# Patient Record
Sex: FEMALE | Race: BLACK OR AFRICAN AMERICAN | NOT HISPANIC OR LATINO | Employment: UNEMPLOYED | URBAN - METROPOLITAN AREA
[De-identification: names, ages, dates, MRNs, and addresses within clinical notes are randomized per-mention and may not be internally consistent; named-entity substitution may affect disease eponyms.]

---

## 2018-01-29 ENCOUNTER — HOSPITAL ENCOUNTER (EMERGENCY)
Facility: CLINIC | Age: 58
Discharge: HOME | End: 2018-01-29
Attending: EMERGENCY MEDICINE | Admitting: EMERGENCY MEDICINE
Payer: COMMERCIAL

## 2018-01-29 VITALS
TEMPERATURE: 98.6 F | RESPIRATION RATE: 20 BRPM | OXYGEN SATURATION: 98 % | SYSTOLIC BLOOD PRESSURE: 123 MMHG | HEART RATE: 88 BPM | DIASTOLIC BLOOD PRESSURE: 72 MMHG

## 2018-01-29 DIAGNOSIS — B02.9 HERPES ZOSTER WITHOUT COMPLICATION: ICD-10-CM

## 2018-01-29 PROCEDURE — 99283 EMERGENCY DEPT VISIT LOW MDM: CPT

## 2018-01-29 RX ORDER — TRAMADOL HYDROCHLORIDE 50 MG/1
50 TABLET ORAL EVERY 6 HOURS PRN
Qty: 15 TABLET | Refills: 0 | Status: SHIPPED | OUTPATIENT
Start: 2018-01-29

## 2018-01-29 RX ORDER — VALACYCLOVIR HYDROCHLORIDE 1 G/1
1000 TABLET, FILM COATED ORAL 3 TIMES DAILY
Qty: 21 TABLET | Refills: 0 | Status: SHIPPED | OUTPATIENT
Start: 2018-01-29

## 2018-01-29 ASSESSMENT — ENCOUNTER SYMPTOMS
NAUSEA: 0
BACK PAIN: 1
VOMITING: 0
FEVER: 0
ABDOMINAL PAIN: 1

## 2018-01-29 NOTE — DISCHARGE INSTRUCTIONS
Please follow up closely with your regular physician. Please return to the ED if your symptoms worsen or if you develop new or concerning symptoms.         Shingles  Shingles is a viral infection caused by the same virus as chicken pox. Anyone who has had chicken pox may get shingles later in life. The virus stays in the body, but remains dormant (asleep). Shingles often occurs in older persons or persons with lowered immunity. But it can affect anyone at any age.  Shingles starts as a tingling patch of skin on one side of the body. Small, painful blisters may then appear. The rash does not spread to the rest of the body.  Exposure to shingles cannot cause shingles. However, it can cause chicken pox in anyone who has not had chicken pox or has not been vaccinated. The contagious period ends when all blisters have crusted over (generally about 2 weeks after the illness begins).  After the blisters heal, the affected skin may be sensitive or painful for months (neuralgia). This often gradually goes away.  A shingles vaccine is available. This can help prevent shingles or make it less painful. It is generally recommended for adults over the age of 60 who have had chicken pox in the past, but who have never had shingles. Adults over 60 who have had neither chicken pox nor shingles can prevent both diseases with the chicken pox vaccine. Ask your healthcare provider about these vaccines.  Home care    Medicines may be prescribed to help relieve pain. Take these medicines as directed. Ask your healthcare provider or pharmacist before using over-the-counter medicines for helping treat pain and itching.    In certain cases, antiviral medicines may be prescribed to reduce pain, shorten the illness, and prevent neuralgia. Take these medicines as directed.    Compresses made from a solution of cool water mixed with cornstarch or baking soda may help relieve pain and itching.     Gently wash skin daily with soap and water to  help prevent infection.  Be certain to rinse off all of the soap, which can be irritating.    Trim fingernails and try not to scratch. Scratching the sores may leave scars.    Stay home from work or school until all blisters have formed a crust and you are no longer contagious.  Follow-up care  Follow up with your healthcare provider or as directed by our staff.  When to seek medical advice    Fever of 100.4 F (38 C) or higher, or as directed by your healthcare provider    Affected skin is on the face or neck and any of the following occur:    Headache    Eye pain    Changes in vision    Sores near the eye    Weakness of facial muscles    Pain, redness, or swelling of a joint    Signs of skin infection: colored drainage from the sores, warmth, increasing redness, or increasing pain  Date Last Reviewed: 9/25/2015 2000-2017 [employer]. [address] [place] [address]. All rights reserved. This information is not intended as a substitute for professional medical care. Always follow your healthcare professional's instructions.

## 2018-01-29 NOTE — ED PROVIDER NOTES
History     Chief Complaint:  Abdominal Pain and Groin Pain      HPI   Candace Baez is a 58 year old female who presents with rash and RLQ abdominal/groin pain. The patient reports that beginning about a week ago she began to have a rash in the RLQ that extended to her back. The worsening RLQ pain and burning prompted today's visit to the emergency department.     Here now in the emergency department, she reports continuing RLQ and back pain and rash, but denies fever, nausea, or vomiting. She states that she has not had this before. She reports taking Tylenol for the pain.     Allergies:  NKDA     Medications:    The patient is currently on no regular medications.      Past Medical History:    The patient denies any significant past medical history.    Past Surgical History:    The patient does not have any pertinent past surgical history  Family History:    No past pertinent family history.    Social History:  The patient presents with her .   Marital Status:   [2]    Review of Systems   Constitutional: Negative for fever.   Gastrointestinal: Positive for abdominal pain. Negative for nausea and vomiting.   Musculoskeletal: Positive for back pain.   Skin: Positive for rash.   All other systems reviewed and are negative.    Physical Exam   First Vitals:  BP: 123/72  Pulse: 88  Temp: 98.6  F (37  C)  Resp: 20  SpO2: 98 %    Physical Exam  Constitutional: The patient is oriented to person, place, and time. Alert and cooperative.  HENT:   Right Ear: External ear normal.   Left Ear: External ear normal.   Nose: Nose normal.   Mouth/Throat: Uvula is midline, oropharynx is clear and moist and mucous membranes are normal. No posterior oropharyngeal edema or erythema.   Eyes: Conjunctivae, EOM and lids are normal. Pupils are equal, round, and reactive to light.   Neck: Trachea normal. Normal range of motion. Neck supple.   Cardiovascular: Normal rate, regular rhythm, normal heart sounds, and intact distal  pulses.    Pulmonary/Chest: Effort normal and breath sounds equal bilaterally. No crackles or wheezing.   Abdominal: Soft. No tenderness. No rebound and no guarding.   Musculoskeletal: Normal range of motion.  No extremity tenderness or edema.  Neurological: Alert and Oriented. Strength 5/5 in upper and lower extremities bilaterally. Sensation intact to light touch throughout.  Skin: Erythema with scattered vesicular lesions to the R flank extended into the abdomen along a dermatome. No purulent drainage.          Emergency Department Course     Emergency Department Course:  Nursing notes and vitals reviewed. 1748 I performed an exam of the patient as documented above.     1801 I rechecked the patient and discussed the results of her workup thus far.     Findings and plan explained to the Patient and spouse. Patient discharged home with instructions regarding supportive care, medications, and reasons to return. The importance of close follow-up was reviewed. The patient was prescribed Valtrex and Ultram.     Impression & Plan      Medical Decision Making:  Candace Baez is a 58 year old female who presents for evaluation of painful rash on her abdomen and back.  This is consistent clinically with herpes zoster.  Will start valacyclovir for this and pain medicine as noted above.  No signs at this point of disseminated zoster nor V1/eye involvement.  Patient is not immunocompromised and I see no signs of systemic illness that might have lead to this.  Therefore, I will not get lab work to look for things like HIV or leukemia. She has no findings to suggest a superimposed bacterial infectious process. No evidence to suggest an acute intra-abdominal process. I did discuss with the patient that I recommend close follow up with her regular physician and she notes understanding and agreement with this plan. Return instructions were given. She was stable/improved at the time of discharge.     Diagnosis:    ICD-10-CM   1.  Herpes zoster without complication B02.9       Disposition:  discharged to home    Discharge Medications:   Details   valACYclovir (VALTREX) 1000 mg tablet Take 1 tablet (1,000 mg) by mouth 3 times daily, Disp-21 tablet, R-0, Local Print      traMADol (ULTRAM) 50 MG tablet Take 1 tablet (50 mg) by mouth every 6 hours as needed for pain, Disp-15 tablet, R-0, Local Print           I, Camila Valencia, am serving as a scribe on 1/29/2018 at 5:35 PM to personally document services performed by Kelsy Carnes MD based on my observations and the provider's statements to me.       Camila Valencia  1/29/2018   [facility] EMERGENCY DEPARTMENT       Kelsy Carnes MD  01/30/18 1737

## 2018-01-29 NOTE — ED AVS SNAPSHOT
[facility] Emergency Department    [address]    [facility] [address]    Phone:  [phone]    Fax:  [phone]                                       Candace Baez   MRN: [id number]    Department:  [facility] Emergency Department   Date of Visit:  1/29/2018           After Visit Summary Signature Page     I have received my discharge instructions, and my questions have been answered. I have discussed any challenges I see with this plan with the nurse or doctor.    ..........................................................................................................................................  Patient/Patient Representative Signature      ..........................................................................................................................................  Patient Representative Print Name and Relationship to Patient    ..................................................               ................................................  Date                                            Time    ..........................................................................................................................................  Reviewed by Signature/Title    ...................................................              ..............................................  Date                                                            Time

## 2018-01-29 NOTE — ED AVS SNAPSHOT
[facility] Emergency Department    [address]    [facility] [address]    Phone:  [phone]    Fax:  [phone]                                       Candace Baez   MRN: [id number]    Department:  [facility] Emergency Department   Date of Visit:  1/29/2018           Patient Information     Date Of Birth          1960        Your diagnoses for this visit were:     Herpes zoster without complication        You were seen by Kelsy Carnes MD.      Follow-up Information     Follow up with [facility] In 3 days.    Specialties:  Sports Medicine, Pain Management, Obstetrics & Gynecology, Pediatrics, Internal Medicine, Nephrology    Contact information:    [address]  [facility] [address] [phone]        Discharge Instructions       Please follow up closely with your regular physician. Please return to the ED if your symptoms worsen or if you develop new or concerning symptoms.         Shingles  Shingles is a viral infection caused by the same virus as chicken pox. Anyone who has had chicken pox may get shingles later in life. The virus stays in the body, but remains dormant (asleep). Shingles often occurs in older persons or persons with lowered immunity. But it can affect anyone at any age.  Shingles starts as a tingling patch of skin on one side of the body. Small, painful blisters may then appear. The rash does not spread to the rest of the body.  Exposure to shingles cannot cause shingles. However, it can cause chicken pox in anyone who has not had chicken pox or has not been vaccinated. The contagious period ends when all blisters have crusted over (generally about 2 weeks after the illness begins).  After the blisters heal, the affected skin may be sensitive or painful for months (neuralgia). This often gradually goes away.  A shingles vaccine is available. This can help prevent shingles or make it less  painful. It is generally recommended for adults over the age of 60 who have had chicken pox in the past, but who have never had shingles. Adults over 60 who have had neither chicken pox nor shingles can prevent both diseases with the chicken pox vaccine. Ask your healthcare provider about these vaccines.  Home care    Medicines may be prescribed to help relieve pain. Take these medicines as directed. Ask your healthcare provider or pharmacist before using over-the-counter medicines for helping treat pain and itching.    In certain cases, antiviral medicines may be prescribed to reduce pain, shorten the illness, and prevent neuralgia. Take these medicines as directed.    Compresses made from a solution of cool water mixed with cornstarch or baking soda may help relieve pain and itching.     Gently wash skin daily with soap and water to help prevent infection.  Be certain to rinse off all of the soap, which can be irritating.    Trim fingernails and try not to scratch. Scratching the sores may leave scars.    Stay home from work or school until all blisters have formed a crust and you are no longer contagious.  Follow-up care  Follow up with your healthcare provider or as directed by our staff.  When to seek medical advice    Fever of 100.4 F (38 C) or higher, or as directed by your healthcare provider    Affected skin is on the face or neck and any of the following occur:    Headache    Eye pain    Changes in vision    Sores near the eye    Weakness of facial muscles    Pain, redness, or swelling of a joint    Signs of skin infection: colored drainage from the sores, warmth, increasing redness, or increasing pain  Date Last Reviewed: 9/25/2015 2000-2017 [employer]. [address] [place] [address]. All rights reserved. This information is not intended as a substitute for professional medical care. Always follow your healthcare professional's instructions.          24 Hour Appointment  Hotline       To make an appointment at any [facility], call [phone] ([phone]). If you don't have a family doctor or clinic, we will help you find one. [facility] are conveniently located to serve the needs of you and your family.             Review of your medicines      START taking        Dose / Directions Last dose taken    traMADol 50 MG tablet   Commonly known as:  ULTRAM   Dose:  50 mg   Quantity:  15 tablet        Take 1 tablet (50 mg) by mouth every 6 hours as needed for pain   Refills:  0        valACYclovir 1000 mg tablet   Commonly known as:  VALTREX   Dose:  1000 mg   Quantity:  21 tablet        Take 1 tablet (1,000 mg) by mouth 3 times daily   Refills:  0                Prescriptions were sent or printed at these locations (2 Prescriptions)                   Other Prescriptions                Printed at Department/Unit printer (2 of 2)         valACYclovir (VALTREX) 1000 mg tablet               traMADol (ULTRAM) 50 MG tablet                Orders Needing Specimen Collection     None      Pending Results     No orders found from 1/27/2018 to 1/30/2018.            Pending Culture Results     No orders found from 1/27/2018 to 1/30/2018.            Pending Results Instructions     If you had any lab results that were not finalized at the time of your Discharge, you can call the ED Lab Result RN at [phone]. You will be contacted by this team for any positive Lab results or changes in treatment. The nurses are available 7 days a week from 10A to 6:30P.  You can leave a message 24 hours per day and they will return your call.        Test Results From Your Hospital Stay               Clinical Quality Measure: Blood Pressure Screening     Your blood pressure was checked while you were in the emergency department today. The last reading we obtained was  BP: 123/72 . Please read the guidelines below about what these numbers mean and what you should do about them.  If your systolic  "blood pressure (the top number) is less than 120 and your diastolic blood pressure (the bottom number) is less than 80, then your blood pressure is normal. There is nothing more that you need to do about it.  If your systolic blood pressure (the top number) is 120-139 or your diastolic blood pressure (the bottom number) is 80-89, your blood pressure may be higher than it should be. You should have your blood pressure rechecked within a year by a primary care provider.  If your systolic blood pressure (the top number) is 140 or greater or your diastolic blood pressure (the bottom number) is 90 or greater, you may have high blood pressure. High blood pressure is treatable, but if left untreated over time it can put you at risk for heart attack, stroke, or kidney failure. You should have your blood pressure rechecked by a primary care provider within the next 4 weeks.  If your provider in the emergency department today gave you specific instructions to follow-up with your doctor or provider even sooner than that, you should follow that instruction and not wait for up to 4 weeks for your follow-up visit.        Thank you for choosing [facility]       Thank you for choosing [facility] for your care. Our goal is always to provide you with excellent care. Hearing back from our patients is one way we can continue to improve our services. Please take a few minutes to complete the written survey that you may receive in the mail after you visit with us. Thank you!        [employer] Information     [employer] lets you send messages to your doctor, view your test results, renew your prescriptions, schedule appointments and more. To sign up, go to [email or web address] . Click on \"Log in\" on the left side of the screen, which will take you to the Welcome page. Then click on \"Sign up Now\" on the right side of the page.     You will be asked to enter the access code listed below, as well as some personal information. Please follow the " directions to create your username and password.     Your access code is: [email or web address]  Expires: 2018  6:33 PM     Your access code will  in 90 days. If you need help or a new code, please call your [facility] clinic or [phone].        Care EveryWhere ID     This is your Care EveryWhere ID. This could be used by other organizations to access your [facility] medical records  [id number]        Equal Access to Services     TAMEKA CARRASCO : Hadii farshad fletcher hadasho Soomaali, waaxda luqadaha, qaybta kaalmada adeegyada, maite cummings . So [facility] [phone].    ATENCIÓN: Si habla español, tiene a antonio disposición servicios gratuitos de asistencia lingüística. Llame al [phone].    We comply with applicable federal civil rights laws and [place] laws. We do not discriminate on the basis of race, color, national origin, age, disability, sex, sexual orientation, or gender identity.            After Visit Summary       This is your record. Keep this with you and show to your community pharmacist(s) and doctor(s) at your next visit.

## 2019-01-15 ENCOUNTER — HOSPITAL ENCOUNTER (EMERGENCY)
Facility: CLINIC | Age: 59
Discharge: HOME | End: 2019-01-15
Attending: EMERGENCY MEDICINE | Admitting: EMERGENCY MEDICINE
Payer: COMMERCIAL

## 2019-01-15 ENCOUNTER — APPOINTMENT (OUTPATIENT)
Dept: MRI IMAGING | Facility: CLINIC | Age: 59
End: 2019-01-15
Payer: COMMERCIAL

## 2019-01-15 VITALS
RESPIRATION RATE: 16 BRPM | WEIGHT: 170 LBS | HEART RATE: 65 BPM | DIASTOLIC BLOOD PRESSURE: 81 MMHG | TEMPERATURE: 98.3 F | OXYGEN SATURATION: 98 % | SYSTOLIC BLOOD PRESSURE: 163 MMHG

## 2019-01-15 DIAGNOSIS — R42 VERTIGO: ICD-10-CM

## 2019-01-15 LAB
ANION GAP SERPL CALCULATED.3IONS-SCNC: 6 MMOL/L (ref 3–14)
BASOPHILS # BLD AUTO: 0 10E9/L (ref 0–0.2)
BASOPHILS NFR BLD AUTO: 0.2 %
BUN SERPL-MCNC: 11 MG/DL (ref 7–30)
CALCIUM SERPL-MCNC: 8.9 MG/DL (ref 8.5–10.1)
CHLORIDE SERPL-SCNC: 102 MMOL/L (ref 94–109)
CO2 SERPL-SCNC: 28 MMOL/L (ref 20–32)
CREAT SERPL-MCNC: 0.79 MG/DL (ref 0.52–1.04)
DIFFERENTIAL METHOD BLD: NORMAL
EOSINOPHIL # BLD AUTO: 0.1 10E9/L (ref 0–0.7)
EOSINOPHIL NFR BLD AUTO: 0.9 %
ERYTHROCYTE [DISTWIDTH] IN BLOOD BY AUTOMATED COUNT: 12.8 % (ref 10–15)
GFR SERPL CREATININE-BSD FRML MDRD: 82 ML/MIN/{1.73_M2}
GLUCOSE SERPL-MCNC: 105 MG/DL (ref 70–99)
HCT VFR BLD AUTO: 41 % (ref 35–47)
HGB BLD-MCNC: 13.3 G/DL (ref 11.7–15.7)
IMM GRANULOCYTES # BLD: 0 10E9/L (ref 0–0.4)
IMM GRANULOCYTES NFR BLD: 0.2 %
LYMPHOCYTES # BLD AUTO: 1.5 10E9/L (ref 0.8–5.3)
LYMPHOCYTES NFR BLD AUTO: 17.9 %
MCH RBC QN AUTO: 31.4 PG (ref 26.5–33)
MCHC RBC AUTO-ENTMCNC: 32.4 G/DL (ref 31.5–36.5)
MCV RBC AUTO: 97 FL (ref 78–100)
MONOCYTES # BLD AUTO: 0.5 10E9/L (ref 0–1.3)
MONOCYTES NFR BLD AUTO: 5.4 %
NEUTROPHILS # BLD AUTO: 6.4 10E9/L (ref 1.6–8.3)
NEUTROPHILS NFR BLD AUTO: 75.4 %
NRBC # BLD AUTO: 0 10*3/UL
NRBC BLD AUTO-RTO: 0 /100
PLATELET # BLD AUTO: 190 10E9/L (ref 150–450)
POTASSIUM SERPL-SCNC: 3.7 MMOL/L (ref 3.4–5.3)
RBC # BLD AUTO: 4.23 10E12/L (ref 3.8–5.2)
SODIUM SERPL-SCNC: 136 MMOL/L (ref 133–144)
TROPONIN I SERPL-MCNC: <0.015 UG/L (ref 0–0.04)
WBC # BLD AUTO: 8.5 10E9/L (ref 4–11)

## 2019-01-15 PROCEDURE — 25500064 ZZH RX 255 OP 636: Performed by: EMERGENCY MEDICINE

## 2019-01-15 PROCEDURE — 99285 EMERGENCY DEPT VISIT HI MDM: CPT | Mod: 25

## 2019-01-15 PROCEDURE — 70549 MR ANGIOGRAPH NECK W/O&W/DYE: CPT

## 2019-01-15 PROCEDURE — 80048 BASIC METABOLIC PNL TOTAL CA: CPT | Performed by: EMERGENCY MEDICINE

## 2019-01-15 PROCEDURE — 70553 MRI BRAIN STEM W/O & W/DYE: CPT

## 2019-01-15 PROCEDURE — A9585 GADOBUTROL INJECTION: HCPCS | Performed by: EMERGENCY MEDICINE

## 2019-01-15 PROCEDURE — 25000128 H RX IP 250 OP 636: Performed by: EMERGENCY MEDICINE

## 2019-01-15 PROCEDURE — 70544 MR ANGIOGRAPHY HEAD W/O DYE: CPT

## 2019-01-15 PROCEDURE — 93005 ELECTROCARDIOGRAM TRACING: CPT

## 2019-01-15 PROCEDURE — 96374 THER/PROPH/DIAG INJ IV PUSH: CPT | Mod: 59

## 2019-01-15 PROCEDURE — 84484 ASSAY OF TROPONIN QUANT: CPT | Performed by: EMERGENCY MEDICINE

## 2019-01-15 PROCEDURE — 85025 COMPLETE CBC W/AUTO DIFF WBC: CPT | Performed by: EMERGENCY MEDICINE

## 2019-01-15 RX ORDER — AMLODIPINE BESYLATE 5 MG/1
5 TABLET ORAL DAILY
COMMUNITY

## 2019-01-15 RX ORDER — LISINOPRIL 10 MG/1
10 TABLET ORAL DAILY
COMMUNITY

## 2019-01-15 RX ORDER — LORAZEPAM 2 MG/ML
1 INJECTION INTRAMUSCULAR ONCE
Status: COMPLETED | OUTPATIENT
Start: 2019-01-15 | End: 2019-01-15

## 2019-01-15 RX ORDER — GADOBUTROL 604.72 MG/ML
10 INJECTION INTRAVENOUS ONCE
Status: COMPLETED | OUTPATIENT
Start: 2019-01-15 | End: 2019-01-15

## 2019-01-15 RX ORDER — CHOLECALCIFEROL (VITAMIN D3) 50 MCG
1 TABLET ORAL DAILY
COMMUNITY

## 2019-01-15 RX ADMIN — GADOBUTROL 10 ML: 604.72 INJECTION INTRAVENOUS at 22:07

## 2019-01-15 RX ADMIN — LORAZEPAM 1 MG: 2 INJECTION INTRAMUSCULAR; INTRAVENOUS at 20:47

## 2019-01-15 ASSESSMENT — ENCOUNTER SYMPTOMS
DIZZINESS: 1
HEADACHES: 0
NUMBNESS: 0
WEAKNESS: 0

## 2019-01-15 NOTE — ED AVS SNAPSHOT
[facility] Emergency Department  [address]  [facility] [address]  Phone:  [phone]  Fax:  [phone]                                    Ashley Ramos   MRN: [id number]    Department:  [facility] Emergency Department   Date of Visit:  1/15/2019           After Visit Summary Signature Page    I have received my discharge instructions, and my questions have been answered. I have discussed any challenges I see with this plan with the nurse or doctor.    ..........................................................................................................................................  Patient/Patient Representative Signature      ..........................................................................................................................................  Patient Representative Print Name and Relationship to Patient    ..................................................               ................................................  Date                                   Time    ..........................................................................................................................................  Reviewed by Signature/Title    ...................................................              ..............................................  Date                                               Time          22EPIC Rev 08/18

## 2019-01-15 NOTE — ED TRIAGE NOTES
"Pt presents with \"Extreme dizziness\". Started at around 5:10 PM. Describes as the \"room spinning super fast and it feels better when I tilt me head to the right\" Feels like she has to keep her eyes close due to the dizziness. Denies nausea or headache. Denies recent head injury, not on blood thinners.   "

## 2019-01-16 LAB — INTERPRETATION ECG - MUSE: NORMAL

## 2019-01-16 NOTE — ED PROVIDER NOTES
"  History     Chief Complaint:  Dizziness    HPI   Ashley Ramos is a 59 year old female, with a history of hypertension, who presents with dizziness. The patient's daughter states that the patient had a sudden onset of dizziness while washing dishes approximately 1.5 hours ago. She notes that she did feel off balance and felt like she was going to fall, but landed on the couch, prompting their ED visit. Here, the patient states that she had resolution of symptoms after approximately 10 minutes from onset. She denies any associated vision changes, headache, one sided numbness or weakness, head trauma, or other injury. She additionally denies any past medical history of MI or diabetes. The patient notes that she was evaluated by ENT clinic on 12/27 for tinnitus and ongoing \"itching and popping sensation\" in her ears.       Allergies:  NKDA     Medications:    Amlodipine  Lisinopril    Past Medical History:    Hypertension  Aortic valve stenosis  Hyperlipidemia  H. Pylori infection    Past Surgical History:    The patient does not have any pertinent past surgical history.     Family History:    No past pertinent family history.     Social History:  Presents with her daughter.  Negative for alcohol use.  Negative for tobacco use.     Review of Systems   Eyes: Negative for visual disturbance.   Neurological: Positive for dizziness (resolved). Negative for weakness, numbness and headaches.   All other systems reviewed and are negative.      Physical Exam   First Vitals:  BP: 168/87  Pulse: 73  Heart Rate: 73  Temp: 98.3  F (36.8  C)  Resp: 16  Weight: 77.1 kg (170 lb)  SpO2: 100 %    Physical Exam  Constitutional:  Appears well-developed and well-nourished. Alert. Conversant with her daughter in [place], who interprets.  They declined a formal .  Patient seems to understand a lot of English.  She seems alert, intelligent.  Non toxic.  HENT:   Head: Atraumatic. No depressed skull fracture, [place] " Eyes, Castaneda's sign, or hemotympanum. Face normal. TMs normal  Nose: Nose normal.  Mouth/Throat: Oral mucosa is clear and moist. no trismus. Pharynx normal. Tonsils symmetric. No tonsillar enlargement, erythema, or exudate.  Eyes: no nystagmus. Conjunctivae normal. EOM normal. Pupils equal, round, and reactive to light. No scleral icterus.   Neck: Normal range of motion. Neck supple. No tracheal deviation present.   Cardiovascular: Normal rate, regular rhythm. No gallop. No friction rub. No murmur heard. Symmetric radial artery pulses   Pulmonary/Chest: Effort normal. No stridor. No respiratory distress. No wheezes. No rales. No rhonchi . No tenderness.   Abdominal: Soft. Bowel sounds normal. No distension. No mass. No tenderness. No rebound. No guarding.   Musculoskeletal:   RUE: Normal range of motion. No tenderness. No deformity  LUE: Normal range of motion. No tenderness. No deformity  RLE: Normal range of motion. No edema. No tenderness. No deformity  LLE: Normal range of motion. No edema. No tenderness. No deformity  Lymph: No cervical adenopathy.   Neurological: Alert and oriented to person, place, and time. Mental status normal. Attention normal.  Alert and oriented x3.  GCS 15. Memory normal. Speech fluent. Cognition normal.    Cranial Nerves intact II-XII except I did not formally test gag or visual acuity.  EOMI. Palate elevates symmetrically and tongue protrudes in the midline.    Strength:   5/5 bilaterally in the trapezius, 5/5 bilaterally in the deltoid, 5/5 bilaterally in the biceps, 5/5 bilaterally in the triceps, 5/5 bilaterally in thegrip, 5/5 bilaterally thumb opposition, 5/5 bilaterally finger abduction  5/5 bilaterally in the psoas, 5/5 bilaterally in the quadriceps, 5/5 bilaterally in the hamstring, 5/5 bilaterally in the gastrocnemius, 5/5 bilaterally in the tibialis anterior    Sensation intact to light touch in both upper extremities (C4-T1)  Sensation intact to light touch in Both lower  extremities (L4-S1).     Finger to nose and coordination normal. Gait normal.  Romberg negative.  Skin: Skin is warm and dry. No rash noted. No pallor. Normal capillary refill.  Psychiatric:  Normal mood. Normal affect.     Emergency Department Course   ECG:  Indication: dizziness  Time: 1949  Vent. Rate 68 bpm. DC interval 170. QRS duration 94. QT/QTc 374/397. P-R-T axis 54 4 62. Normal sinus rhythm. Normal ECG. Read time: 1956.      Imaging:  Radiographic findings were communicated with the patient who voiced understanding of the findings.  MR Neck w/o and w/ contrast angiogram:   Normal MR angiogram of the neck. As per radiology     MR Brain w/o and w/ contrast:   Diffuse cerebral volume loss and cerebral white matter  changes consistent with chronic small vessel ischemic disease. No  evidence for acute intracranial pathology.  As per radiology    MR Head w/o contrast angiogram:   Normal MR angiogram of the head.  As per radiology     Laboratory:  CBC: WBC: 8.5, HGB: 13.3, PLT: 190  BMP: Glucose 105 (H) o/w WNL (Creatinine: 0.79)     Troponin: <0.015    Interventions:  2047 Ativan, 1 mg, IV      Emergency Department Course:  Nursing notes and vitals reviewed. EKG was obtained, findings above.      1920  I performed an exam of the patient as documented above.     IV inserted. Medicine administered as documented above. Blood drawn. This was sent to the lab for further testing, results above.    The patient was sent for a brain, neck, and head MRI while in the emergency department, findings above.     2257 I rechecked the patient and discussed the results of her workup thus far.     Findings and plan explained to the Patient. Patient discharged home with instructions regarding supportive care, medications, and reasons to return. The importance of close follow-up was reviewed. The patient was prescribed aspirin.    I personally reviewed the laboratory results with the Patient and answered all related questions prior  to discharge.       Impression & Plan      Medical Decision Making:  Ashley Ramos is a 59 year old female who came to the ER today with her daughter for evaluation of an acute 10-minute episode of intense dizziness and nausea and difficulty walking.  I suspect that they are describing an episode of vertigo.  It apparently began when she was twisting and bending to put a pot into the sink.  Here in the ER she is now symptom-free and neurologically intact.  We were not able to provoke her vertigo by position change, sitting up, laying down, or ambulation.  Initial differential for her dizziness, presumed vertigo, was broad.  I considered possible BPPV but without being able to provoke it here in the ER as not a definitive diagnosis.  Other causes of acute vestibular syndrome were considered, but seem unlikely with such a brief episode.  We considered possible TIA.    Workup for possible TIA was undertaken.  EKG shows sinus rhythm and no A. fib.  Labs are normal.  MRI shows no acute stroke but does show chronic small vessel ischemic disease.  MRA shows no evidence for atherosclerotic disease in the vertebrals or carotids that would put her at risk for stroke.  ABCD 2 score is 2, which puts her in a low risk category.  I think the patient safe for outpatient follow-up and further workup.  She may need an echocardiogram to look for PFO and further cardiac monitoring to look for transient arrhythmias.  We will start the patient on aspirin.  Precautions to return to the ER immediately with recurrent symptoms.    Differential would also include cardiac arrhythmia.  Here in the ER she has sinus rhythm and no normality.  She had no associated chest pain, and EKG is nonischemic/troponin is normal.  I do not think this represents ACS.  Electrolytes are normal.  Patient has no associated abdominal pain, recent symptoms of GI bleeding, and is not anemic.    Diagnosis:    ICD-10-CM    1. Vertigo R42     an episode of  peripheral vertigo vs TIA       Disposition:  discharged to home    Discharge Medications:   Details   aspirin (ASA) 81 MG tablet Take 1 tablet (81 mg) by mouth daily for 14 days  Qty: 14 tablet, Refills: 0       I, Keren Villatoro, yashira serving as a scribe on 1/15/2019 at 7:12 PM to personally document services performed by Rajan Rg MD based on my observations and the provider's statements to me.      Keren Villatoro  1/15/2019   [facility] EMERGENCY DEPARTMENT       Rajan Rg MD  01/15/19 [id number]

## 2024-06-03 ENCOUNTER — APPOINTMENT (OUTPATIENT)
Dept: GENERAL RADIOLOGY | Facility: CLINIC | Age: 64
End: 2024-06-03
Attending: EMERGENCY MEDICINE
Payer: COMMERCIAL

## 2024-06-03 ENCOUNTER — HOSPITAL ENCOUNTER (EMERGENCY)
Facility: CLINIC | Age: 64
Discharge: HOME | End: 2024-06-03
Attending: PHYSICIAN ASSISTANT | Admitting: PHYSICIAN ASSISTANT
Payer: COMMERCIAL

## 2024-06-03 VITALS
TEMPERATURE: 97.5 F | SYSTOLIC BLOOD PRESSURE: 133 MMHG | HEART RATE: 81 BPM | RESPIRATION RATE: 18 BRPM | OXYGEN SATURATION: 98 % | DIASTOLIC BLOOD PRESSURE: 56 MMHG

## 2024-06-03 DIAGNOSIS — S80.00XA CONTUSION OF KNEE, UNSPECIFIED LATERALITY, INITIAL ENCOUNTER: ICD-10-CM

## 2024-06-03 PROCEDURE — 250N000013 HC RX MED GY IP 250 OP 250 PS 637: Performed by: PHYSICIAN ASSISTANT

## 2024-06-03 PROCEDURE — 73562 X-RAY EXAM OF KNEE 3: CPT | Mod: 50

## 2024-06-03 PROCEDURE — 99283 EMERGENCY DEPT VISIT LOW MDM: CPT

## 2024-06-03 RX ORDER — IBUPROFEN 600 MG/1
600 TABLET, FILM COATED ORAL EVERY 6 HOURS PRN
Qty: 40 TABLET | Refills: 0 | Status: SHIPPED | OUTPATIENT
Start: 2024-06-03

## 2024-06-03 RX ORDER — ACETAMINOPHEN 500 MG
1000 TABLET ORAL ONCE
Status: COMPLETED | OUTPATIENT
Start: 2024-06-03 | End: 2024-06-03

## 2024-06-03 RX ORDER — ACETAMINOPHEN 500 MG
500-1000 TABLET ORAL EVERY 6 HOURS PRN
Qty: 40 TABLET | Refills: 0 | Status: SHIPPED | OUTPATIENT
Start: 2024-06-03

## 2024-06-03 RX ADMIN — ACETAMINOPHEN 1000 MG: 500 TABLET, FILM COATED ORAL at 18:21

## 2024-06-03 ASSESSMENT — COLUMBIA-SUICIDE SEVERITY RATING SCALE - C-SSRS
1. IN THE PAST MONTH, HAVE YOU WISHED YOU WERE DEAD OR WISHED YOU COULD GO TO SLEEP AND NOT WAKE UP?: NO
2. HAVE YOU ACTUALLY HAD ANY THOUGHTS OF KILLING YOURSELF IN THE PAST MONTH?: NO
6. HAVE YOU EVER DONE ANYTHING, STARTED TO DO ANYTHING, OR PREPARED TO DO ANYTHING TO END YOUR LIFE?: NO

## 2024-06-03 ASSESSMENT — ACTIVITIES OF DAILY LIVING (ADL)
ADLS_ACUITY_SCORE: 35
ADLS_ACUITY_SCORE: 35

## 2024-06-03 NOTE — ED TRIAGE NOTES
Walking and slipped on wet floor.  C/o bilat knee pain. No other injuries.  No thinners.      Triage Assessment (Adult)       Row Name 06/03/24 5006          Triage Assessment    Airway WDL WDL        Respiratory WDL    Respiratory WDL WDL        Skin Circulation/Temperature WDL    Skin Circulation/Temperature WDL WDL        Cardiac WDL    Cardiac WDL WDL        Peripheral/Neurovascular WDL    Peripheral Neurovascular WDL WDL        Cognitive/Neuro/Behavioral WDL    Cognitive/Neuro/Behavioral WDL WDL

## 2024-06-03 NOTE — ED PROVIDER NOTES
Emergency Department Note      History of Present Illness     Chief Complaint  Fall    HPI  Candace Baez is a 64 year old female with a history of diabetes mellitus and hyperlipidemia, who presents to the ED for bilateral knee pain after slipping on a wet floor and falling. The patient iced her knees, but took no pain medication. She is not on any blood thinners.  Denies, hitting her head. No back, hip. Neck or head pain. Pain with walking, but still able to bear weight.    Independent Historian  Male  present at bedside and corroborate the above    Review of External Notes    Past Medical History   Medical History and Problem List  Colonic polyp  Asthma  Type 2 diabetes   Anemia  Hyperlipidemia   PTSD  TB lung   OA  Tension headache   Recurrent miscarriages  Depression  Insomnia   Vertigo   Chronic headache   Aortic valve stenosis   Fibromyalgia     Medications  Tramadol  Valacyclovir  Omeprazole   Albuterol  Metformin  Pravastatin     Surgical History   Lap cholecystectomy   section   Female circumcision  Colonoscopy   Physical Exam   Patient Vitals for the past 24 hrs:   BP Temp Temp src Pulse Resp SpO2   24 1657 133/56 97.5  F (36.4  C) Temporal 81 18 98 %             Physical Exam  Vitals and nursing note reviewed.   Constitutional:       Appearance: She is not diaphoretic.   HENT:      Head: Atraumatic. No raccoon eyes, Sosa's sign, right periorbital erythema or left periorbital erythema.      Nose: Nose normal.   Eyes:      Conjunctiva/sclera: Conjunctivae normal.   Pulmonary:      Effort: Pulmonary effort is normal.   Musculoskeletal:      Cervical back: No pain with movement. Normal range of motion.      Thoracic back: No swelling, deformity or tenderness. Normal range of motion.      Lumbar back: No swelling, deformity or tenderness. Normal range of motion.      Right hip: No deformity or tenderness. Normal range of motion. Normal strength.      Left hip: No deformity or  tenderness. Normal range of motion. Normal strength.      Right upper leg: No swelling or tenderness.      Left upper leg: No swelling or tenderness.      Right knee: No swelling, deformity, effusion, erythema or ecchymosis. Normal range of motion. Tenderness present over the medial joint line and lateral joint line. No patellar tendon tenderness. No LCL laxity, MCL laxity, ACL laxity or PCL laxity. Normal alignment.      Left knee: No swelling, deformity, effusion, erythema or ecchymosis. Normal range of motion. Tenderness present over the medial joint line and lateral joint line. No patellar tendon tenderness. No LCL laxity, MCL laxity, ACL laxity or PCL laxity.Normal alignment.      Right lower leg: No swelling, deformity or tenderness.      Left lower leg: No swelling, deformity or tenderness.      Right ankle: No swelling or ecchymosis. Normal range of motion. Normal pulse.      Left ankle: No swelling or ecchymosis. Normal range of motion. Normal pulse.   Skin:     Capillary Refill: Capillary refill takes less than 2 seconds.      Coloration: Skin is not jaundiced or pale.      Findings: No bruising.   Neurological:      Mental Status: She is alert and oriented to person, place, and time. Mental status is at baseline.      GCS: GCS eye subscore is 4. GCS verbal subscore is 5. GCS motor subscore is 6.      Cranial Nerves: No cranial nerve deficit, dysarthria or facial asymmetry.      Sensory: Sensation is intact.      Motor: Motor function is intact.   Psychiatric:         Mood and Affect: Mood normal.         Behavior: Behavior normal.         Thought Content: Thought content normal.             Diagnostics   Lab Results   Labs Ordered and Resulted from Time of ED Arrival to Time of ED Departure - No data to display    Imaging  XR Knee Bilateral 3 Views   Final Result   IMPRESSION:    RIGHT KNEE: No acute fracture or malalignment. Mild medial and patellofemoral compartment degenerative changes. No knee joint  effusion.      LEFT KNEE: No acute fracture or malalignment. Mild medial and patellofemoral compartment degenerative changes. No knee joint effusion.        Independent Interpretation  I independently interpreted the Knee XR,  No fractures or dislocations.   ED Course    Medications Administered  Medications   acetaminophen (TYLENOL) tablet 1,000 mg (1,000 mg Oral $Given 6/3/24 4231)       Procedures  Procedures     Discussion of Management  None    Social Determinants of Health adding to complexity of care  None    ED Course     Medical Decision Making / Diagnosis   CMS Diagnoses: None    MIPS     None    MDM  Candace Baez is a 64 year old female presents after mechanical fall.  Patient's only complaint is bilateral knee pain.  Patient is able to stand bear weight she given Tylenol with improvement of her pain.  X-ray imaging obtained showing no evidence of fracture.  The rest of her exam is not concerning for additional injury of the back chest extremities neck or head.  She is neurovascularly intact.  Recommend follow-up with her primary doctor if her pain is persistent if she develops worsening concerns or symptoms return back to the emergency department.    Disposition  The patient was discharged.     ICD-10 Codes:    ICD-10-CM    1. Contusion of knee, unspecified laterality, initial encounter  S80.00XA            Discharge Medications  Discharge Medication List as of 6/3/2024  6:22 PM        START taking these medications    Details   acetaminophen (TYLENOL) 500 MG tablet Take 1-2 tablets (500-1,000 mg) by mouth every 6 hours as needed for pain, Disp-40 tablet, R-0, Local Print      ibuprofen (ADVIL/MOTRIN) 600 MG tablet Take 1 tablet (600 mg) by mouth every 6 hours as needed for moderate pain (with food), Disp-40 tablet, R-0, Local Print               Scribe Disclosure:  Myah DOTSON, am serving as a scribe at 6:02 PM on 6/3/2024 to document services personally performed by Luc Uriarte PA-C based on  my observations and the provider's statements to me.        Luc Uriarte, JONO  06/04/24 0634